# Patient Record
Sex: MALE | Race: WHITE
[De-identification: names, ages, dates, MRNs, and addresses within clinical notes are randomized per-mention and may not be internally consistent; named-entity substitution may affect disease eponyms.]

---

## 2018-12-29 ENCOUNTER — HOSPITAL ENCOUNTER (EMERGENCY)
Dept: HOSPITAL 80 - FED | Age: 73
Discharge: HOME | End: 2018-12-29
Payer: COMMERCIAL

## 2018-12-29 VITALS — DIASTOLIC BLOOD PRESSURE: 91 MMHG | SYSTOLIC BLOOD PRESSURE: 135 MMHG

## 2018-12-29 DIAGNOSIS — M79.89: ICD-10-CM

## 2018-12-29 DIAGNOSIS — M79.641: Primary | ICD-10-CM

## 2018-12-29 LAB — PLATELET # BLD: 307 10^3/UL (ref 150–400)

## 2018-12-29 NOTE — EDPHY
HPI/HX/ROS/PE/MDM


Narrative: 





CHIEF COMPLAINT: Right hand pain





HPI: The patient is a 74 y/o male with a history of CAD and aortic valve 

replacement complaining of of right hand pain and swelling onset last night. He 

was walking his dog two days ago and his right hand was jerked forward by his 

dog. He cannot describe this injury further. He had mild pain at the time that 

felt like "some kind of pulled muscle thing" and improved quickly. Then last 

night after dinner he noticed his right hand was diffusely swollen and any 

grasping motion was painful. Pain is not localized nor is it worse with 

palpation. No associated wrist, forearm, or elbow pain. He denies history of 

blood clots or current anticoagulant use. He denies fever, myalgias, or other 

complaints. 





REVIEW OF SYSTEMS:


A comprehensive 10 system review of systems is otherwise negative aside from 

elements mentioned in the history of present illness. Cough improving over last 

week-and-a-half. 





PMH: Prostate cancer, CAD, aortic stenosis leading to valve replacement in 2010 





SOCIAL HISTORY: Lives in [place]. . Retired. 





PHYSICAL EXAM:


General:Patient is alert, in no acute distress.


ENT:Eyes are normal to inspection.  ENT inspection normal.


Neck: Normal inspection.  Full range of motion.


Respiratory:No respiratory distress.  Breath sounds normal bilaterally.


Cardiovascular: Regular rate and rhythm.  Strong peripheral pulses.  Normal cap 

refill.


Abdomen:The abdomen is nontender to palpation. There are no peritoneal signs. 


Back: Normal to inspection.  No tenderness to palpation.


Skin: Normal color.  No rash.  Warm and dry.


Extremities: Right hand diffusely swollen with overlying erythem extending 3" 

proximally from the wrist. Pain elicited by flexion of fingers and with flexion/

extension of the wrist. Otherwise normal appearance and full range of motion.


Neuro: Oriented x3.  Normal motor function.  Normal sensory function.


ED Course: 





This is a 74 y/o male who presents with what he describes as sudden onset 

diffuse right hand swelling and pain with grasping two days after he was pulled 

by his dog while walking. His right hand is diffusely swollen with overlying 

erythema extending 3" proximal to wrist. No bony tenderness. Presentation is 

more concerning for infection or possibly blood clot rather than injury. He is 

afebrile. Plan for IV, labs, imaging. 





Hand x-ray: no acute findings.





Right upper extremity US: negative.


MDM: 





This patient presents with significant swelling of his entire right hand 

including digits, which is evenly distributed and symmetrical.  The patient 

attributes this to a soft tissue/twisting injury sustained while walking his 

dog a few days ago.  There is no focal bony tenderness, and he has no pain with 

wrist movement or palpation.  XR negative.  US negative for DVT.  Doubt 

infection as WBC normal, afebrile and there is no significant erythema or 

warmth.  Degree of swelling seems out of proportion to injury, but it is 

possible patient has just been keeping hand in dependent position which has 

been exacerbating swelling.  I cautioned the patient that his presentation is 

somewhat unusual and he is to return to the ED immediately for any worsening of 

condition. 





- Data Points


Imaging Results: 


 Imaging Impressions





Hand X-Ray  12/29/18 08:40


Impression:


1. Nonspecific soft tissue calcification near the distal pole of the scaphoid 

bone. No evidence for acute fracture.


2. 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 

fingers.


3. Degenerative change first carpometacarpal joint.


4. Old posttraumatic deformity of the fifth metacarpal.











Imaging: I viewed and interpreted images myself


Laboratory Results: 


 Laboratory Results





 12/29/18 09:17 





 











  12/29/18 12/29/18





  09:17 09:17


 


WBC    8.82 10^3/uL 10^3/uL





    (3.80-9.50) 


 


RBC    5.38 10^6/uL 10^6/uL





    (4.40-6.38) 


 


Hgb    15.1 g/dL g/dL





    (13.7-17.5) 


 


Hct    43.5 % %





    (40.0-51.0) 


 


MCV    80.9 fL L fL





    (81.5-99.8) 


 


MCH    28.1 pg pg





    (27.9-34.1) 


 


MCHC    34.7 g/dL g/dL





    (32.4-36.7) 


 


RDW    12.8 % %





    (11.5-15.2) 


 


Plt Count    307 10^3/uL 10^3/uL





    (150-400) 


 


MPV    8.6 fL L fL





    (8.7-11.7) 


 


Neut % (Auto)    76.7 % H %





    (39.3-74.2) 


 


Lymph % (Auto)    11.5 % L %





    (15.0-45.0) 


 


Mono % (Auto)    10.5 % %





    (4.5-13.0) 


 


Eos % (Auto)    0.1 % L %





    (0.6-7.6) 


 


Baso % (Auto)    0.5 % %





    (0.3-1.7) 


 


Nucleat RBC Rel Count    0.0 % %





    (0.0-0.2) 


 


Absolute Neuts (auto)    6.77 10^3/uL H 10^3/uL





    (1.70-6.50) 


 


Absolute Lymphs (auto)    1.01 10^3/uL 10^3/uL





    (1.00-3.00) 


 


Absolute Monos (auto)    0.93 10^3/uL H 10^3/uL





    (0.30-0.80) 


 


Absolute Eos (auto)    0.01 10^3/uL L 10^3/uL





    (0.03-0.40) 


 


Absolute Basos (auto)    0.04 10^3/uL 10^3/uL





    (0.02-0.10) 


 


Absolute Nucleated RBC    0.00 10^3/uL 10^3/uL





    (0-0.01) 


 


Immature Gran %    0.7 % %





    (0.0-1.1) 


 


Immature Gran #    0.06 10^3/uL 10^3/uL





    (0.00-0.10) 


 


Sodium  Pending   





   


 


Potassium  Pending   





   


 


Chloride  Pending   





   


 


Carbon Dioxide  Pending   





   


 


Anion Gap  Pending   





   


 


BUN  Pending   





   


 


Creatinine  Pending   





   


 


Estimated GFR  Pending   





   


 


Glucose  Pending   





   


 


Calcium  Pending   





   














General


Time Seen by Provider: 12/29/18 08:37


Initial Vital Signs: 


 Initial Vital Signs











Temperature (C)  36.7 C   12/29/18 08:32


 


Heart Rate  96   12/29/18 08:32


 


Respiratory Rate  18   12/29/18 08:32


 


Blood Pressure  158/86 H  12/29/18 08:32


 


O2 Sat (%)  94   12/29/18 08:32








 











O2 Delivery Mode               Room Air














Allergies/Adverse Reactions: 


 





No Known Allergies Allergy (Verified 12/29/18 08:32)


 








Home Medications: 














 Medication  Instructions  Recorded


 


Aspirin  12/29/18


 


Losartan Potassium  12/29/18














Departure





- Departure


Disposition: Home, Routine, Self-Care


Clinical Impression: 


 Swelling of right hand





Condition: Good


Instructions:  Swollen Joint (ED)


Additional Instructions: 


Follow up with hand specialist in the next 2-3 days. You've been referred to 

Dr. Emanuel locally. 





Return to the ED if you develop severe pain, worsening swelling, spread of 

redness up your arm,  fever, or other worsening of condition. 


Referrals: 


Brent Whitten MD [Primary Care Provider] - As per Instructions


Urban Castellanos MD [Medical Doctor] - As per Instructions


Report Scribed for: Juan R Michaud


Report Scribed by: Vernell Luong


Date of Report: 12/29/18


Time of Report: 09:34


Physician Review and Approval Statement: Portions of this note were transcribed 

by an ED scribe.  I personally performed the history, physical exam, and 

medical decision making; and confirm the accuracy of the information in the 

transcribed note.